# Patient Record
Sex: FEMALE | Race: WHITE
[De-identification: names, ages, dates, MRNs, and addresses within clinical notes are randomized per-mention and may not be internally consistent; named-entity substitution may affect disease eponyms.]

---

## 2018-09-11 ENCOUNTER — HOSPITAL ENCOUNTER (INPATIENT)
Dept: HOSPITAL 12 - SRC | Age: 58
LOS: 7 days | Discharge: OTHER INSTITUTION | DRG: 895 | End: 2018-09-18
Attending: INTERNAL MEDICINE | Admitting: INTERNAL MEDICINE
Payer: COMMERCIAL

## 2018-09-11 VITALS — BODY MASS INDEX: 27.32 KG/M2 | WEIGHT: 170 LBS | HEIGHT: 66 IN

## 2018-09-11 VITALS — DIASTOLIC BLOOD PRESSURE: 58 MMHG | SYSTOLIC BLOOD PRESSURE: 103 MMHG

## 2018-09-11 VITALS — DIASTOLIC BLOOD PRESSURE: 61 MMHG | SYSTOLIC BLOOD PRESSURE: 103 MMHG

## 2018-09-11 DIAGNOSIS — S00.81XA: ICD-10-CM
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
## 2018-09-11 DIAGNOSIS — Z90.710: ICD-10-CM

## 2018-09-11 DIAGNOSIS — F41.9: ICD-10-CM

## 2018-09-11 DIAGNOSIS — Z79.899: ICD-10-CM
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
## 2018-09-11 DIAGNOSIS — K21.9: ICD-10-CM
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
## 2018-09-11 DIAGNOSIS — Z86.69: ICD-10-CM

## 2018-09-11 DIAGNOSIS — F10.230: Primary | ICD-10-CM

## 2018-09-11 DIAGNOSIS — F33.1: ICD-10-CM

## 2018-09-11 DIAGNOSIS — E83.42: ICD-10-CM

## 2018-09-11 LAB
ALP SERPL-CCNC: 84 U/L (ref 50–136)
ALT SERPL W/O P-5'-P-CCNC: 68 U/L (ref 14–59)
AMYLASE SERPL-CCNC: 66 U/L (ref 25–115)
AST SERPL-CCNC: 122 U/L (ref 15–37)
BASOPHILS # BLD AUTO: 0.1 K/UL (ref 0–8)
BASOPHILS NFR BLD AUTO: 1.3 % (ref 0–2)
BILIRUB SERPL-MCNC: 0.8 MG/DL (ref 0.2–1)
BUN SERPL-MCNC: 5 MG/DL (ref 7–18)
CHLORIDE SERPL-SCNC: 104 MMOL/L (ref 98–107)
CO2 SERPL-SCNC: 24 MMOL/L (ref 21–32)
CREAT SERPL-MCNC: 0.5 MG/DL (ref 0.6–1.3)
EOSINOPHIL # BLD AUTO: 0.2 K/UL (ref 0–0.7)
EOSINOPHIL NFR BLD AUTO: 2.9 % (ref 0–7)
ETHANOL SERPL-MCNC: 284 MG/DL (ref 0–0)
GLUCOSE SERPL-MCNC: 96 MG/DL (ref 74–106)
HCG UR QL: NEGATIVE
HCT VFR BLD AUTO: 39.1 % (ref 31.2–41.9)
HGB BLD-MCNC: 13.4 G/DL (ref 10.9–14.3)
LIPASE SERPL-CCNC: 221 U/L (ref 73–393)
LYMPHOCYTES # BLD AUTO: 1.7 K/UL (ref 20–40)
LYMPHOCYTES NFR BLD AUTO: 29.2 % (ref 20.5–51.5)
MAGNESIUM SERPL-MCNC: 1.6 MG/DL (ref 1.8–2.4)
MCH RBC QN AUTO: 36.4 UUG (ref 24.7–32.8)
MCHC RBC AUTO-ENTMCNC: 34 G/DL (ref 32.3–35.6)
MCV RBC AUTO: 106 FL (ref 75.5–95.3)
MONOCYTES # BLD AUTO: 0.8 K/UL (ref 2–10)
MONOCYTES NFR BLD AUTO: 14 % (ref 0–11)
NEUTROPHILS # BLD AUTO: 3.1 K/UL (ref 1.8–8.9)
NEUTROPHILS NFR BLD AUTO: 52.6 % (ref 38.5–71.5)
PLATELET # BLD AUTO: 120 K/UL (ref 179–408)
POTASSIUM SERPL-SCNC: 4.4 MMOL/L (ref 3.5–5.1)
RBC # BLD AUTO: 3.69 MIL/UL (ref 3.63–4.92)
TSH SERPL DL<=0.005 MIU/L-ACNC: 1.41 MIU/ML (ref 0.36–3.74)
WBC # BLD AUTO: 6 K/UL (ref 3.8–11.8)
WS STN SPEC: 7.7 G/DL (ref 6.4–8.2)

## 2018-09-11 PROCEDURE — A4663 DIALYSIS BLOOD PRESSURE CUFF: HCPCS

## 2018-09-11 PROCEDURE — G0480 DRUG TEST DEF 1-7 CLASSES: HCPCS

## 2018-09-11 PROCEDURE — HZ2ZZZZ DETOXIFICATION SERVICES FOR SUBSTANCE ABUSE TREATMENT: ICD-10-PCS | Performed by: INTERNAL MEDICINE

## 2018-09-11 RX ADMIN — FOLIC ACID SCH MG: 1 TABLET ORAL at 14:04

## 2018-09-11 RX ADMIN — THERA TABS SCH UDTAB: TAB at 14:04

## 2018-09-11 NOTE — NUR
End Of Shift 



Pt has been laying in bed mostly throughout shift since being admitted. Pt states she feels 
anxious about what to anticipate and wants to know how long she needs to be here. CIWA or 
tapers have not been started or ordered. Pt ate 100% of meals. No prns given. Safety 
measures in place. Endorsement given to night shift nurse.

## 2018-09-11 NOTE — NUR
Start of Shift



Patient on bed, noted to be depressed, melancholic and isolative. Patient appears not 
motivated to communicate, answering mostly with yes and no and with no eye contact. Patient 
verbalized experiencing intermittent chills, feeling anxious, fatigue, easily agitated and 
with tremors noted. Patient noted to be flushed, disheveled and unkempt, with body odor. 
Encouraged patient to take a shower. Patient stated that she will take a shower tomorrow. 
Fall, universal, seizure and safety prec in place. Call light within reach. Latest CIWA=17. 
Will continue to monitor.

## 2018-09-11 NOTE — NUR
PRN Ativan



Patient is tremulous, jittery and with tremors. Patient also c/o feeling anxious and with 
dark undereye circles. CIWA=17. Administered Ativan 2 mg PO PRN as ordered. Will reassess.

## 2018-09-11 NOTE — NUR
Pre-admission Note



Received pt at intake. Pt is a 57 y/o F [facility] Pt exhibits glossy red eyes, black and blue bruising that is clearing up around 
the eyes and nose, nose is swollen and has a scab/lesion right under L nostril - pt states 
this is all due to a fall from having a SZ unrelated to w/d last week tues. Pt has a flat 
affect, depressive mood, higher emotional amplitude, tearful at times when speaking, 
exhibiting feelings of remorse, presents agitation, anxiety, restlessness, and pt states she 
feels "tense, bothered and overwhelmed." Pt is the primary source of info, has a slow verbal 
response and low tone of voice. Pt is a poor historian, has difficulty thinking and 
concentrating. PMH of anxiety, depression, total hysterectomy, bladder repair surgery d/t 
complication from hysterectomy, GERD. Pt reports having sporadic seizures unrelated to ETOH 
withdrawal since in her 20's. Pt reports she had a seizure last week while cleaning in her 
home; states she cannot recall her memory before the seizure, son found her on the floor 
covered in blood from her nose. Pt states she has not been seen by the doctor in fear of 
judgement of her drinking. Hx of SI with SA in 2015; pt states she was always depressed and 
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 Pt states, "I wanted attention and 
took some small red pills, I don't remember what but I took a bunch of them and cut my 
wrists. It was only superficially." PCP is Dr. Foley in [place]. Pt reports having been to 
multiple treatment centers, never have completed treatment, always leaving early within 5 
days of admission. Denies smoking. 



Initial VS are BP: 131/85, HR: 96, RR: 16, O2sat: 96%



Substance Use Hx: 



1. ETOH - 96 oz (4 x 24 oz can) of beer daily x 6 month. Last used today 09/11/18, at 1030. 
Pt states, "I never stay sober but I have periods were I try to not drink and it would last 
typically last 1 to 2 days." FIrst used at 36 y/o after first divorce. Got involved with a 
bad bf that drank and progressed. First started becoming a problem in 2000. Longest sobriety 
is 60 days in 2013.

Pt reports she used to drink 8-12 x 12 oz beer daily for 15- 20 cans of 12 oz beer years 
prior and 16+ prior for 10 years.
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
## 2018-09-11 NOTE — NUR
Ativan PRN reassess



Patient appears less anxious, with tremors less prominent. Patient also is less tremulous, 
verbalized, "I feel a little better with the medication." CIWA=11.

## 2018-09-12 VITALS — SYSTOLIC BLOOD PRESSURE: 122 MMHG | DIASTOLIC BLOOD PRESSURE: 74 MMHG

## 2018-09-12 VITALS — DIASTOLIC BLOOD PRESSURE: 95 MMHG | SYSTOLIC BLOOD PRESSURE: 148 MMHG

## 2018-09-12 VITALS — DIASTOLIC BLOOD PRESSURE: 65 MMHG | SYSTOLIC BLOOD PRESSURE: 112 MMHG

## 2018-09-12 VITALS — SYSTOLIC BLOOD PRESSURE: 136 MMHG | DIASTOLIC BLOOD PRESSURE: 81 MMHG

## 2018-09-12 VITALS — SYSTOLIC BLOOD PRESSURE: 126 MMHG | DIASTOLIC BLOOD PRESSURE: 68 MMHG

## 2018-09-12 VITALS — SYSTOLIC BLOOD PRESSURE: 108 MMHG | DIASTOLIC BLOOD PRESSURE: 67 MMHG

## 2018-09-12 LAB
ALP SERPL-CCNC: 80 U/L (ref 50–136)
ALT SERPL W/O P-5'-P-CCNC: 59 U/L (ref 14–59)
AMPHETAMINES UR QL SCN>1000 NG/ML: NEGATIVE
AST SERPL-CCNC: 87 U/L (ref 15–37)
BARBITURATES UR QL SCN: NEGATIVE
BILIRUB DIRECT SERPL-MCNC: 0.4 MG/DL (ref 0–0.2)
BILIRUB SERPL-MCNC: 1.2 MG/DL (ref 0.2–1)
COCAINE UR QL SCN: NEGATIVE
MAGNESIUM SERPL-MCNC: 1.6 MG/DL (ref 1.8–2.4)
OPIATES UR QL SCN: NEGATIVE
PCP UR QL SCN>25 NG/ML: NEGATIVE
THC UR QL SCN>50 NG/ML: NEGATIVE
WS STN SPEC: 7 G/DL (ref 6.4–8.2)

## 2018-09-12 RX ADMIN — LORAZEPAM SCH MG: 1 TABLET ORAL at 12:50

## 2018-09-12 RX ADMIN — LORAZEPAM SCH MG: 1 TABLET ORAL at 21:21

## 2018-09-12 RX ADMIN — PANTOPRAZOLE SODIUM SCH MG: 40 TABLET, DELAYED RELEASE ORAL at 08:58

## 2018-09-12 RX ADMIN — THERA TABS SCH UDTAB: TAB at 08:58

## 2018-09-12 RX ADMIN — Medication SCH MG: at 10:37

## 2018-09-12 RX ADMIN — LORAZEPAM SCH MG: 1 TABLET ORAL at 08:57

## 2018-09-12 RX ADMIN — Medication SCH MG: at 08:58

## 2018-09-12 RX ADMIN — LORAZEPAM SCH MG: 1 TABLET ORAL at 16:34

## 2018-09-12 RX ADMIN — FOLIC ACID SCH MG: 1 TABLET ORAL at 08:58

## 2018-09-12 NOTE — NUR
CIWA  ASSESSMENT

CIWA=14 at 2000.  The patient experienced moderate withdrawal symptoms such as anxiety, 
agitation, irritability, headache, nervousness, tremors, restlessness, and fatigue.  
Scheduled medications will be administrated as ordered.  Encouraged to intake fluids as 
tolerated.  Safe and calm environment provided.  All needs met.  Safety measures: Call light 
within reach, bed locked in lowest position,  padded bed rails up x2.  Will continue to 
monitor closely.

## 2018-09-12 NOTE — NUR
End Of Shift



Pt has been isolative in room, speaks in a very low tone and soft voice, is hypoactive. Pt 
has  increasing amplitude of withdrawal symptom, gross tremors, anxiety, nausea, lethargy, 
fatigue,  agitation, restlessness, sweating, feeling cold/hot sensations, have sensitivity 
to the light and decreased appetite. Last CIWA 18. No prns given. Mag-ox given and slow-mag 
will start tomorrow. Pt refused PPD test; states she gets a positive result w/ redness at 
site but CXR is neg. BP elevated; last bp: 148/95 HR: 74. Encouraged pt to verbalize 
feelings about situation and to increase fluids as tolerated. Safety measures in place.

## 2018-09-12 NOTE — NUR
CIWA 11



Patient presents with intermittent chills, tremors, anxiety and with intermittent nausea. 
Patient stated that she has mild sensitivity to light. Will continue to monitor.

## 2018-09-12 NOTE — NUR
End of Shift



Patient continues to be depressed, melancholic and isolative. Patient verbalized that she 
still experiences intermittent chills, feeling anxious, fatigue, easily agitated and with 
tremors noted. Patient noted to be flushed, disheveled and unkempt, with body odor. Patient 
stated that she will take a shower this morning. Fall, universal, seizure and safety prec in 
place. Call light within reach. Latest CIWA=12, slept for 7 hours. Endorsed to AM shift 
nurse for continuity of care.

## 2018-09-12 NOTE — NUR
START OF SHIFT NOTE:

This report is on patient, 58 years old female, admitted yesterday for Alcohol withdrawal.  
The patient tolerated well with ordered 5 day Ativan taper, that started today.  Withdrawal 
symptoms will be closely monitoring.  Patient remains compliant with treatment, medications, 
and diet regimen.  Patient is alert and oriented x4, cooperative, with stable gait, clear 
soft speech.  Patient reports NKA, is on Full Code, Regular diet, Fall and Seizures 
precautions.  Patient report history of seizures r/t withdrawal from alcohol, the most 
recent  seizure was on 09/04/2018.  Latest CIWA= 18 at 1600.  Patient presented with 
moderate withdrawal symptoms such as anxiety, agitation, irritability, headache, mild 
nausea/w no vomiting,  nervousness, moderate tremors, restlessness, and fatigue.  No PRN 
medications was administered during day shift, per day shift nurse report.  Patient was 
encouraged to fluids intake as tolerated, and to attend groups activities.  Safe and calm 
environment provided.  All needs met.  Safety measures in place: Call light within reach, 
bed locked in lowest position,  padded bed rails up bilaterally.  Endorsed by  day shift 
nurse.  Report received.  Will be monitoring closely.

## 2018-09-12 NOTE — NUR
CIWA 12



Patient with continuous intermittent chills, anxiety, tremors and verbalized being sensitive 
to light. With dizziness reported. Will continue monitor.

## 2018-09-12 NOTE — NUR
Start Of Shift / CIWA 12



Pt is a 59 y/o F admitted yesterday 09/12/18 for medically supervised ETOH withdrawal. Pt is 
placed on a 5 day Ativan taper that will starting this morning. Received pt laying in bed w. 
a disheveled appearance, a depressive mood and a flat affect. Pt presents anxiety, gross 
tremors, nausea, alternating extremes of feeling cold and hot, sweating, fatigue, lethargy, 
agitation, easily irritable and restlessness. Encouraged pt to increase fluids as tolerated 
to facilitate in detox. Educated pt on s/s to report and pt verbalized understanding. Side 
rails upx2 and padded, bed is in low position, call light within reach. Safety measures in 
place. Will continue to monitor.

## 2018-09-12 NOTE — NUR
CIWA 16



Pt is laying in bed sleeping intermittently and states that she is feeling very tired. Pt 
presents gross tremors, nausea, anxiety and agitation, alternating extremes of feeling cold 
and hot, sweating, fatigue, lethargy, easily irritable and restlessness. Ativan 2 mg will be 
administered. Safety measures in place.

## 2018-09-12 NOTE — NUR
CIWA 18



Pt has been isolative in room, speaks in a very low tone, soft voice, is hypoactive. Pt 
presents nausea, lethargy, fatigue, anxious, is grossly tremulous, agitated, restless, have 
sensitivity to the light. Ativan 2 mg po will be administered. Encouraged pt to verbalize 
feelings about situation and to increase fluids as tolerated. Safety measures in place.

## 2018-09-13 VITALS — SYSTOLIC BLOOD PRESSURE: 131 MMHG | DIASTOLIC BLOOD PRESSURE: 78 MMHG

## 2018-09-13 VITALS — SYSTOLIC BLOOD PRESSURE: 145 MMHG | DIASTOLIC BLOOD PRESSURE: 88 MMHG

## 2018-09-13 VITALS — SYSTOLIC BLOOD PRESSURE: 119 MMHG | DIASTOLIC BLOOD PRESSURE: 68 MMHG

## 2018-09-13 VITALS — SYSTOLIC BLOOD PRESSURE: 123 MMHG | DIASTOLIC BLOOD PRESSURE: 75 MMHG

## 2018-09-13 VITALS — SYSTOLIC BLOOD PRESSURE: 135 MMHG | DIASTOLIC BLOOD PRESSURE: 86 MMHG

## 2018-09-13 VITALS — SYSTOLIC BLOOD PRESSURE: 148 MMHG | DIASTOLIC BLOOD PRESSURE: 91 MMHG

## 2018-09-13 VITALS — DIASTOLIC BLOOD PRESSURE: 49 MMHG | SYSTOLIC BLOOD PRESSURE: 148 MMHG

## 2018-09-13 VITALS — SYSTOLIC BLOOD PRESSURE: 134 MMHG | DIASTOLIC BLOOD PRESSURE: 77 MMHG

## 2018-09-13 PROCEDURE — HZ41ZZZ GROUP COUNSELING FOR SUBSTANCE ABUSE TREATMENT, BEHAVIORAL: ICD-10-PCS

## 2018-09-13 PROCEDURE — HZ31ZZZ INDIVIDUAL COUNSELING FOR SUBSTANCE ABUSE TREATMENT, BEHAVIORAL: ICD-10-PCS

## 2018-09-13 RX ADMIN — CLONIDINE HYDROCHLORIDE PRN MG: 0.1 TABLET ORAL at 21:35

## 2018-09-13 RX ADMIN — LORAZEPAM SCH MG: 1 TABLET ORAL at 08:47

## 2018-09-13 RX ADMIN — Medication SCH MG: at 08:47

## 2018-09-13 RX ADMIN — PANTOPRAZOLE SODIUM SCH MG: 40 TABLET, DELAYED RELEASE ORAL at 06:20

## 2018-09-13 RX ADMIN — LORAZEPAM SCH MG: 1 TABLET ORAL at 20:54

## 2018-09-13 RX ADMIN — IBUPROFEN PRN MG: 600 TABLET, FILM COATED ORAL at 08:47

## 2018-09-13 RX ADMIN — LORAZEPAM SCH MG: 1 TABLET ORAL at 14:25

## 2018-09-13 RX ADMIN — FOLIC ACID SCH MG: 1 TABLET ORAL at 08:47

## 2018-09-13 RX ADMIN — HYDROXYZINE PAMOATE PRN MG: 25 CAPSULE ORAL at 22:54

## 2018-09-13 RX ADMIN — THERA TABS SCH UDTAB: TAB at 08:47

## 2018-09-13 NOTE — NUR
CIWA 13

Client is in room, sitting in a couch, she appears with flushed face, moist skin, tremors, 
anxious, depressed mood, flat affect and difficulty concentrating. Client reports having no 
desire to leave her room, feeling imitable an anxious, fatigued, nausea, stomach cramps, 
chills, sweats, and poor appetite. Client refuses PRN medications to manage withdrawal 
symptoms. Will continue to monitor. Call light within reach.

## 2018-09-13 NOTE — NUR
PRN RE-ASSESSMENT

PRN Vistaril 25 mg PO administered for anxiety at 2254 was effective.  Patient is sleeping.  
Respirations are even and unlabored.  RR: 14.  Safe and calm environment provided.  All 
needs met.  Safety measures in place: Call light within reach, bed locked in lowest 
position, padded bed rails up bilaterally.  Will continue monitoring closely.

## 2018-09-13 NOTE — NUR
CIWA 14 & Motrin 600mh PO for headache

Client is in bed, she appears disheveled, strong body odor, and dark circles under eyes. She 
presents with depressed, anxious mood, flat affect tremors, clammy skin, avoidant gaze. 
Client reports headache 6/10 pain level, nausea, stomach cramps, irritability, anxiety, and 
fatigue. Client states, 'I don't feel like getting up, I don't feel well at all." Schedule 
Ativan 2mg PO + above PRN medication administered. Encourage client to increase PO fluid to 
facilitate detox. Encourage client to attend group therapy to learn skills to maintain 
sober. Call light within reach.

## 2018-09-13 NOTE — NUR
END OF SHIFT

Endorse client to incoming nurse, client is in group therapy, a/o x 4. Client continues to 
present with anxiety, agitation, gross tremors, flushed face sweats, difficulty 
concentrating, nausea, abdominal cramps, restless legs, feverish, decreased appetite, 
headache, sense of panic, and fatigue. PRN Motrin 600mg PO for HA. Last CIWA 13 @ 1600. 
Second of 5 day Ativan taper. Adequate PO fluid intake 2800mL, void x 4, stool x 1. Consumes 
75% of meals. Call light within reach.

## 2018-09-13 NOTE — NUR
PRN BENADRYL 50 MG  1TABLET PO ADMINISTRATION

PRN Benadryl  50 mg 1 tablet was administered for insomnia at 2056.  Patient tolerated well. 
 Safe and calm environment provided.   All needs met.  Safety measures in place:  Call light 
within reach, bed locked in lowest position, padded bed rails up bilaterally.  Will continue 
monitoring closely.

## 2018-09-13 NOTE — NUR
CIWA  ASSESSMENT

CIWA=12 at 0000.  The patient presented with anxiety, agitation, irritability, nervousness, 
sweating, flashed skin, clammy skin, tremors, restlessness, and fatigue.  Encouraged to 
intake fluids as tolerated.  Safe and calm environment provided.  All needs met.   Safety 
measures: Call light within reach, bed locked in lowest position, padded bed rails up x2.  
Will continue to monitor closely.

## 2018-09-13 NOTE — NUR
END  OF SHIFT NOTE:

Endorsed patient is alert and oriented x4, cooperative, with stable gait, clear soft speech, 
tolerated well with ordered 5 day Ativan taper, today is second day.  Patient was noted with 
anxiety, agitation, nervousness,  bilateral tremors, sweating, restlessness, and fatigue.  
CIWA=14 at 2000, CIWA=12 at 0000. Last CIWA= 12 at 0400.  Withdrawal symptoms was closely 
monitored.  No PRN medications was administered during my shift.  The patient remains 
compliant with treatment plan, medications, and diet regime.   Educated for non 
pharmacological method that can be used to help control pain.  Encouraged to intake fluids 
as tolerated.   Encouraged to attend group activities.  Patient slept for 11 hours, intake 
1,050 ml, output: voided x1, stool x1.  Safe and calm environment provided.  All needs met.  
Safety measures: Call light within reach, bed locked in lowest position, padded bed rails up 
x2.  Endorsed to day shift nurse.

## 2018-09-13 NOTE — NUR
START OF SHIFT NOTE

Report received for patient  admitted for Alcohol withdrawal, continues ordered 5 day Ativan 
taper, which tolerated well.    Withdrawal symptoms will be closely monitoring.  Patient is 
presented in her room  alert and oriented x4: place, time person, and situation.  The most 
recent CIWA=13 at 1634.  Throughout day shift she was c/o  anxiety, agitation, nervousness, 
mild nausea, w/o vomiting,  stomach cramps, bilateral tremors, flashed face, clammy skin, 
barely sweating, restlessness, and fatigue.   PRN Motrin 600 mg PO was administered  at 0847 
for headache, and was effective, per day shift nurse report.  Patient remains compliant with 
treatment, medications, and diet regime.  Patient denies to attend groups activities, and 
was encouraged to increased her activities.  Encouraged to fluids intake as tolerated.  Safe 
and calm environment provided.  All needs met.  Safety measures in place: Call light within 
reach,  bed is locked in lowest position, padded bed rails up x2.  Patient was endorsed by  
outgoing day shift nurse.  Will continue to monitor closely.

## 2018-09-13 NOTE — NUR
PRN RE-ASSESSMENT

PRN Clonidine 0.1 mg PO administered for /91 was effective: BP decreased to 145/88.  
1:1 sitter at bedside with patient for safety.   Safe and calm environment provided.  All 
needs met.  Safety measures in place: Call light within reach,  bed is locked in lowest 
position, padded bed rails up x2.   Will continue to monitor closely.

## 2018-09-13 NOTE — NUR
CIWA 13 

Client presents with anxiety, agitation, chills. Client also reports tremors, sweats, 
nausea, stomach cramps, decreased appetite, and fatigue. Encourage client to verbalize 
feelings that makes her feel anxious. Call light within reach.

## 2018-09-13 NOTE — NUR
PRN  RE-ASSESSMENT

Benadryl  50 mg PO administered for insomnia at 2056 was  ineffective.   PRN Medications 
will be administered as ordered.  1:1 sitter at bedside with patient for safety.  Safe and 
calm environment provided.  All needs met.  Safety measures in place: Call light within 
reach, bed is locked in lowest position, padded bed rails up x2.   Will continue to monitor 
closely.

## 2018-09-13 NOTE — NUR
1:1 status



Patient with episode of dizziness, nausea and verbalized "I'm feeling wobbly, feels like I'm 
floating." Patient stated that she lost he balance and hit her head on the side of the bed. 
Dr. Morris notified and her ordered to place patient on 1:1. BP [id number]=844/91. Primary Nurse 
is made aware. PRN medications will be administered.

## 2018-09-13 NOTE — NUR
PRN CLONIDINE 0.1 MG PO ADMINISTRATION 

PRN Clonidine 0.1 mg PO administered for /91.  Patient tolerated well.  1:1 sitter at 
bedside with patient for safety.  Safe and calm environment provided.  All needs met.  
Safety measures in place: Call light within reach,  bed is locked in lowest position, padded 
bed rails up x2.   Will continue to monitor closely.

## 2018-09-13 NOTE — NUR
START OF SHIFT

Endorse rcvd from ongoing nurse, client had an uneventful night, client slept 11 hrs. Last 
CIWA 12 @ 0400. Client is in bed, lying on her back, eyes closed, she sounds asleep, easy to 
arouse. RR 16, even, non-labored. Call light within reach. Will continue to monitor.

## 2018-09-13 NOTE — NUR
PRN RE-ASSESSMENT

Patient reports,"My nausea gone, but I am feeling anxious".  PRN Vistaril PO will be 
administered as ordered. 1:1 sitter for safety at bedside, as ordered. All needs met.  
Safety measures in place: Call light within reach,  bed is locked in lowest position, padded 
bed rails up x2.  Will continue to monitor closely.

## 2018-09-13 NOTE — NUR
CIWA  ASSESSMENT

CIWA=10 at 0400.  The patient appears sad and worried.  Mood anxious and flat affect.  The 
patient presented with following withdrawal symptoms such as anxiety, agitation, depression, 
irritability, nervousness,  sweating,  bilateral  tremors, restlessness,  and fatigue.   
Encouraged to intake fluids as tolerated.   Safe and calm environment provided.  All needs 
met.  Safety measures: Call light within reach, bed locked in lowest position,  padded bed 
rails up x2.  Will continue to monitor closely.

## 2018-09-13 NOTE — NUR
PRN  ZOFRAN  (ONDANSETRON 4 MG TAB.RAPDIS) 4 MG 1 TAB.RAPDIS SL ADMINISTRATION 

PRN Zofran 4 mg SL administered for nausea w/o vomiting.  Patient tolerated well.  1:1 
sitter at bedside with patient for safety.   Safe and calm environment provided.   All needs 
met.  Safety measures in place: Call light within reach,  bed is locked in lowest position, 
padded bed rails up x2.  Will continue to monitor closely.

## 2018-09-13 NOTE — NUR
CIWA  ASSESSMENT

CIWA=14  at 2000.  The patient appears worried, anxious mood, flat affect.  She experiences 
moderate withdrawal symptoms such as anxiety, agitation, irritability, nervousness,  nausea, 
stomach cramps, nervousness, tremors, restlessness, and fatigue.   Scheduled medications 
will be administrated as ordered.   Encouraged to intake fluids as tolerated.   Safe and 
calm environment provided.   All needs met.   Safety measures:  Call light within reach, bed 
locked in lowest position,  padded bed rails up x2.  Will continue to monitor closely.

## 2018-09-14 VITALS — DIASTOLIC BLOOD PRESSURE: 78 MMHG | SYSTOLIC BLOOD PRESSURE: 127 MMHG

## 2018-09-14 VITALS — SYSTOLIC BLOOD PRESSURE: 104 MMHG | DIASTOLIC BLOOD PRESSURE: 77 MMHG

## 2018-09-14 VITALS — SYSTOLIC BLOOD PRESSURE: 134 MMHG | DIASTOLIC BLOOD PRESSURE: 79 MMHG

## 2018-09-14 VITALS — SYSTOLIC BLOOD PRESSURE: 130 MMHG | DIASTOLIC BLOOD PRESSURE: 68 MMHG

## 2018-09-14 VITALS — SYSTOLIC BLOOD PRESSURE: 96 MMHG | DIASTOLIC BLOOD PRESSURE: 55 MMHG

## 2018-09-14 VITALS — SYSTOLIC BLOOD PRESSURE: 125 MMHG | DIASTOLIC BLOOD PRESSURE: 87 MMHG

## 2018-09-14 RX ADMIN — Medication SCH MG: at 09:02

## 2018-09-14 RX ADMIN — IBUPROFEN PRN MG: 600 TABLET, FILM COATED ORAL at 09:02

## 2018-09-14 RX ADMIN — IBUPROFEN PRN MG: 600 TABLET, FILM COATED ORAL at 01:21

## 2018-09-14 RX ADMIN — LORAZEPAM SCH MG: 1 TABLET ORAL at 20:51

## 2018-09-14 RX ADMIN — LORAZEPAM SCH MG: 1 TABLET ORAL at 09:02

## 2018-09-14 RX ADMIN — THERA TABS SCH UDTAB: TAB at 09:02

## 2018-09-14 RX ADMIN — FOLIC ACID SCH MG: 1 TABLET ORAL at 09:02

## 2018-09-14 RX ADMIN — LORAZEPAM SCH MG: 1 TABLET ORAL at 12:11

## 2018-09-14 RX ADMIN — LORAZEPAM SCH MG: 1 TABLET ORAL at 16:18

## 2018-09-14 RX ADMIN — PANTOPRAZOLE SODIUM SCH MG: 40 TABLET, DELAYED RELEASE ORAL at 06:57

## 2018-09-14 NOTE — NUR
CIWA  ASSESSMENT

CIWA=12  at 0400  Patient noted anxious, agitated, with flashed face, clammy skin, sweating, 
mild bilateral tremors, and restlessness.  Safe and calm environment provided.   1:1 sitter 
at bedside for safety.  All needs met.  Safety measures: Call light within reach, bed locked 
in lowest position,  padded bed rails up x2.  Will continue to monitor closely.

## 2018-09-14 NOTE — NUR
START OF SHIFT NOTE

Patient is a 58 year old female, presented  for alcohol withdrawal, continues ordered 5 day 
Ativan taper. Today is third day.  She is tolerated well.  CIWA=14  at 2000, CIWA=12 at 
0118.  The most recent CIWA=12 at 0400: Patient experienced  anxiety, agitation, episode of 
dizziness, lightheadedness,  headache, nervousness, nausea, w/o vomiting,  stomach cramps, 
bilateral tremors, flashed face, clammy skin, barely sweating, restlessness, and fatigue.   
Doctor Arturo aware. Order for 1:1 sitter placed.  Benadryl  50 mg PO administered for 
insomnia at 2056 was  ineffective.  PRN Clonidine 0.1 mg PO administered for /91 at 
2135,  PRN Zofran 4 mg SL administered for nausea w/o vomiting at 2142, PRN Vistaril 25 mg 
PO administered for anxiety at 2254, PRN Motrin 600 mg PO administered for headache at 0121, 
and were effective.  Patient remains compliant with medications.   Patient slept for 7 
hours, intake 1,355ml, output: voided x3.  Encouraged to fluids intake as tolerated.  
Encouraged to attend groups activities. Safe and calm environment provided.  All needs met.  
Safety measures in place: Call light within reach, bed locked in lowest position,  padded 
bed rails up bilaterally.  Patient endorsed to day shift nurse.

## 2018-09-14 NOTE — NUR
START OF SHIFT NOTE:

Patient received from outgoing day shift nurse.  Patient tolerated well with 5 day Ativan 
taper (today is third day), ordered  for Benzodiazepines and Alcohol withdrawal.   Patient 
is 1:1 for safety, as ordered. The last  CIWA= 7at 1600. Per day shift  nurse report, 
patient noted during day shift with  s anxiety, agitation, irritability, nervousness, barely 
sweating, headache, mild nausea,  tremors, restlessness, fatigue.   Patient was encouraged 
to fluids intake as tolerated, and to attend groups activities.  PRN Motrin 600 mg PO  
administered for headache at 0902 was effective.  Patient was encouraged to fluids intake as 
tolerated, and to attend groups activities.  Safe and calm environment provided.  All needs 
met.  Safety measures in place: Call light within reach, bed locked in lowest position,  
padded bed rails up bilaterally.  Patient endorsed by outgoing day shift nurse.  Report 
received.  Will be monitoring closely.

## 2018-09-14 NOTE — NUR
CIWA ASSESSMENT



ciwa=11. Easily irritable. Complaints of intermittent perspiration. Bilateral hand tremors 
noted. Anxious and restless. Fidgety and not able to lay still.

## 2018-09-14 NOTE — NUR
END OF SHIFT



Pt 59 y/o female admitted for benzo and opiate withdrawal. Pt alert and oriented to name, 
place, and time. Perrla. Skin warm and moist to touch. Respirations even and unlabored. 
Appears disheveled. Hair uncombed. Clothes scattered throughout the room. Encouraged to 
maintain hygiene. Restless. Fidgety, not able to lay still on bed. Bilateral hand tremors. 
Fatigued. 1:1 sitter for safety. Unsteady gait. Isolative to room. Did not attend group 
activity. Medication compliant. Last ciwa=11 Pt is on 5 day ativan taper and is on 3. Bed on 
lowest position with side rails x 2 up for safety. Call light within reach.

## 2018-09-14 NOTE — NUR
PRN MOTRIN 600 MG PO ADMINISTRATION

Patient c/o headache "5/10" and asked aid.  PRN Motrin 600 mg PO administered for headache 
at 0121 as ordered.   Patient tolerated well.   Safe and calm environment provided.  1:1 
sitter at bedside for safety.  All needs met.  Safety measures in place: Call light within 
reach, bed locked in lowest position, padded bed rails up bilaterally.  Will continue to 
monitor closely.

## 2018-09-14 NOTE — NUR
CIWA ASSESSMENT



ciwa=14. Anxious and restless. Not able to lay still, fidgety. Bilateral hand tremors noted. 
Perspiration on forehead noted. Pt states feels weak and generalized discomfort. Easily 
irritable/ agitated.

## 2018-09-14 NOTE — NUR
CIWA  ASSESSMENT

CIWA=12 at 0118.  The patient appears  worried, anxious mood, depressive affect.  She c/o  
headache"5/10', anxiety, agitation, irritability, nervousness, tremors, restlessness, and 
fatigue.  PRN Motrin PO will be administrated as ordered.   Encouraged to intake fluids as 
tolerated.   Safe and calm environment provided.   1:1 sitter at bedside for safety.  All 
needs met.  Safety measures: Call light within reach, bed locked in lowest position,  padded 
bed rails up x2.  Will continue to monitor closely.

## 2018-09-14 NOTE — NUR
CIWA 14 & PRN Motrin 600mg PO for HA 6/10. 

Client presents with flushed face, flat affect, avoidant gaze, tremors, clammy skin, and 
difficulty concentrating. Client reports tremors, restlessness, sweating, anxiety, 
depression, agitation, nervousness, fatigue, generalized pain, abd cramping and headache 
6/10. Schedule Ativan 1mg PO and above PRN administered. Encourage client to increase PO 
fluid intake as tolerated to facilitate detox. Sitter at bedside. Call light within reach.

## 2018-09-14 NOTE — NUR
Endorse client to RN for continuity of care, discuss all relevant information. RN to 
reassess PRN medications.

## 2018-09-14 NOTE — NUR
PRN BENADRYL 50 MG  1TABLET PO ADMINISTRATION

PRN Benadryl  50 mg 1 tablet was administered for insomnia at 2051.  Patient tolerated well. 
  1:1 sitter at bedside with patient for safety.  Safe and calm environment provided.  All 
needs met.  Safety measures in place: Call light within reach, bed is locked in lowest 
position, padded bed rails up x2.   Will continue to monitor closely.

## 2018-09-14 NOTE — NUR
START OF SHIFT 

Rcvd endorse from ongoing nurse, client is in room, she is lying on her back, eyes, closed, 
dark [place] under eyes and a scab on L upper lip noted, RR 16, even, non-labored, easy to 
arouse. Client is on a 1:1 sitter promoting safety. PRN Clonidine 0.1mg PO, Vistaril 25 mg 
PO for anxiety, Motrin 600mg PO for HA & Benadryl 50mg PO for insomnia, client has been 
sleeping for 7 hrs. Third of 5 day Ativan taper. Last CIWA 12 @ 0400. Seizure precautions in 
place. Bed in lowest/locked position, side rails x 2 up/padded. Call light within reach.

## 2018-09-14 NOTE — NUR
CIWA DEFERRED

Patient is sleeping with snoring.  RR:14.  Respirations are unlabored and even.  CIWA 
deferred, and will be done when patient will be awake.  1:1 sitter at bedside with patient 
for safety.  Safe and calm environment provided.  All needs met.  Safety measures in place: 
Call light within reach,  bed is locked in lowest position, padded bed rails up x2.   Will 
continue to monitor closely.

## 2018-09-14 NOTE — NUR
CIWA  ASSESSMENT

CIWA=10 at 2000.  Patient noted with anxiety, agitation,  nervousness, sweating,  bilateral 
tremors that can be felt, not observe, and restlessness.  Scheduled medications will be 
administered ,as ordered.  Safe and calm environment provided.  1:1 sitter at bedside for 
safety.  All needs met.  Safety measures in place: Call light within reach, bed locked in 
lowest position, padded bed rails up x2.  Will continue to monitor closely.

## 2018-09-14 NOTE — NUR
PRN RE-ASSESSMENT

PRN Motrin 600 mg PO administered for headache at 0121 was effective.  Patient is sleeping.  
RR: 15.  Respirations are even and unlabored.  Safe and calm environment provided.  1:1 
sitter at bedside for safety.  All needs met.  Safety measures in place: Call light within 
reach, bed locked in lowest position, padded bed rails up bilaterally.  Will continue to 
monitor closely.

## 2018-09-15 VITALS — SYSTOLIC BLOOD PRESSURE: 118 MMHG | DIASTOLIC BLOOD PRESSURE: 80 MMHG

## 2018-09-15 VITALS — DIASTOLIC BLOOD PRESSURE: 75 MMHG | SYSTOLIC BLOOD PRESSURE: 118 MMHG

## 2018-09-15 VITALS — SYSTOLIC BLOOD PRESSURE: 123 MMHG | DIASTOLIC BLOOD PRESSURE: 80 MMHG

## 2018-09-15 VITALS — SYSTOLIC BLOOD PRESSURE: 119 MMHG | DIASTOLIC BLOOD PRESSURE: 72 MMHG

## 2018-09-15 VITALS — SYSTOLIC BLOOD PRESSURE: 105 MMHG | DIASTOLIC BLOOD PRESSURE: 74 MMHG

## 2018-09-15 VITALS — DIASTOLIC BLOOD PRESSURE: 62 MMHG | SYSTOLIC BLOOD PRESSURE: 107 MMHG

## 2018-09-15 RX ADMIN — PANTOPRAZOLE SODIUM SCH MG: 40 TABLET, DELAYED RELEASE ORAL at 07:06

## 2018-09-15 RX ADMIN — LORAZEPAM SCH MG: 1 TABLET ORAL at 14:08

## 2018-09-15 RX ADMIN — Medication SCH MG: at 08:50

## 2018-09-15 RX ADMIN — Medication SCH MG: at 16:04

## 2018-09-15 RX ADMIN — FOLIC ACID SCH MG: 1 TABLET ORAL at 08:50

## 2018-09-15 RX ADMIN — THERA TABS SCH UDTAB: TAB at 08:50

## 2018-09-15 RX ADMIN — LORAZEPAM SCH MG: 1 TABLET ORAL at 08:50

## 2018-09-15 RX ADMIN — LORAZEPAM SCH MG: 1 TABLET ORAL at 20:29

## 2018-09-15 NOTE — NUR
END OF SHIFT



Pt 57 y/o female admitted for benzo and opiate withdrawal. 1:1 sitter for safety. Pt alert 
and oriented to name, place, and time. Perrla. Skin warm and moist to touch. Respirations 
even and unlabored. Appears disheveled. Hair uncombed. Clothes scattered throughout the 
room. Encouraged to maintain hygiene. Low motivation for self care. Restless. Fidgety. 
Bilateral hand tremors. Fatigued. Unsteady gait. Isolative to room. Did not attend group 
activity. Medication compliant. Last ciwa=10. Pt is on  a 5 day ativan taper and is on 4. 
Bed on lowest position with side rails x 2 up for safety. Call light within reach.

## 2018-09-15 NOTE — NUR
CIWA=9

Patient  presented with anxiety, agitation, nervousness, tremors, that can be felt, not 
observe,  sweating, flashed face, and clammy skin, fatigue, and restlessness.   Encouraged 
to intake fluids, as tolerated.   1:1 sitter at bedside for safety.  All needs met.  Safe 
and calm environment provide.  Safety measures in place.  Safety measures in place: Call 
light within reach, bed locked in lowest position,  padded bed rails up bilaterally.   Will 
continue to monitor closely.

## 2018-09-15 NOTE — NUR
CIWA ASSESSMENT



ciwa=10. Anxious and restless. Fidgety. Pressured speech noted. Not able to lay still. 
Bilateral hand tremors noted.

## 2018-09-15 NOTE — NUR
START OF SHIFT



Pt 58 y/ female admitted for etoh withdrawal. Pt received in room watching television. 1:1 
sitter for safety. Alert and oriented to name, place, and time. Perrla. Skin warm and moist 
to touch. Respirations even and unlabored. Appears disheveled. Hair uncombed. Clothes 
scattered throughout the room. Encouraged to maintain hygiene. Restless. Fidgety. Not able 
to lay still. Fatigued. Bilateral hand tremors noted. It was reported that pt slept for 9 
hours last night. Last cwia=9 @ 0000. Pt is on a 5 day ativan taper and is on day 5. Bed on 
lowest position with side rails x2 up for safety. Call light within reach.

## 2018-09-15 NOTE — NUR
Start Of Shift

Patient is a 58 yr old female who was admitted to [facility] on 9/11/18 for a medically 
supervised withdrawal from ETOH ( Beer), she has been placed on a 5 day Ativan taper and 
this is day 4. No PRN medication was requested or required on day shift. She continues on a 
1:1 for safety and unsteady gait, sitter is at bedside. Currently she is reading in bed and 
voices no concerns. her mood appears depressed and her affect is flat. Continue to follow MD 
plan of care and offer support and encouragement.

## 2018-09-15 NOTE — NUR
CIWA ASSESSMENT



ciwa=10. Bilateral hand tremors noted. Intermittent perspiration. Fatigued. Restless. 
Pressured speech.

## 2018-09-15 NOTE — NUR
END OF SHIFT NOTE:

Patient is a 58 year female admitted for  alcohol withdrawal, continues 5 day Ativan taper, 
which tolerated well. Today is day 4.  CIWA=10 at 2000, CIWA=9 at 0000.  The most recent  
CIWA=9.  Patient presented  with anxiety, agitation,  nervousness, sweating,  bilateral 
tremors that can be felt, not observe, and restlessness.   Patient was encouraged to fluids 
intake as tolerated. PRN Benadryl  50 mg 1 tablet was administered for insomnia at 2051, and 
was effective.   Patient remains compliant with treatment, medications, and diet regimen.   
Patient slept for 9 hours, intake 700 ml, output: voided x3.  All needs met.  Safe and calm 
environment provide.  1:1 sitter at bedside  for safety, as ordered.  Safety measures in 
place: Call light within reach, bed locked in lowest position, padded bed rails up 
bilaterally.   Patient endorsed to day shift  nurse.

## 2018-09-15 NOTE — NUR
CIWA ASSESSMENT



ciwa=10. Bilateral hand tremors noted. Fatigued. Anxious and restless. Fidgety. Pressured 
speech noted. Not able to lay still.

## 2018-09-15 NOTE — NUR
CIWA  ASSESSMENT

Patient appears anxious, agitated, with flashed face, clammy skin, sweating,  bilateral 
tremors, and restlessness.  CIWA=9 at 0000.   Safe and calm environment provided.   1:1 
sitter at bedside for safety.  All needs met.  Safety measures: Call light within reach, bed 
locked in lowest position, padded bed rails up x2.  Will continue to monitor closely.

## 2018-09-16 VITALS — DIASTOLIC BLOOD PRESSURE: 76 MMHG | SYSTOLIC BLOOD PRESSURE: 114 MMHG

## 2018-09-16 VITALS — SYSTOLIC BLOOD PRESSURE: 110 MMHG | DIASTOLIC BLOOD PRESSURE: 66 MMHG

## 2018-09-16 VITALS — DIASTOLIC BLOOD PRESSURE: 64 MMHG | SYSTOLIC BLOOD PRESSURE: 102 MMHG

## 2018-09-16 VITALS — DIASTOLIC BLOOD PRESSURE: 93 MMHG | SYSTOLIC BLOOD PRESSURE: 134 MMHG

## 2018-09-16 VITALS — DIASTOLIC BLOOD PRESSURE: 69 MMHG | SYSTOLIC BLOOD PRESSURE: 109 MMHG

## 2018-09-16 LAB
ALP SERPL-CCNC: 76 U/L (ref 50–136)
ALT SERPL W/O P-5'-P-CCNC: 51 U/L (ref 14–59)
AST SERPL-CCNC: 65 U/L (ref 15–37)
BASOPHILS # BLD AUTO: 0 K/UL (ref 0–8)
BASOPHILS NFR BLD AUTO: 1 % (ref 0–2)
BILIRUB DIRECT SERPL-MCNC: 0.3 MG/DL (ref 0–0.2)
BILIRUB SERPL-MCNC: 0.9 MG/DL (ref 0.2–1)
EOSINOPHIL # BLD AUTO: 0.2 K/UL (ref 0–0.7)
EOSINOPHIL NFR BLD AUTO: 5.5 % (ref 0–7)
EOSINOPHIL NFR BLD MANUAL: 5 % (ref 0–8)
HCT VFR BLD AUTO: 39.5 % (ref 31.2–41.9)
HGB BLD-MCNC: 13.4 G/DL (ref 10.9–14.3)
LYMPHOCYTES # BLD AUTO: 1.1 K/UL (ref 20–40)
LYMPHOCYTES NFR BLD AUTO: 26.7 % (ref 20.5–51.5)
LYMPHOCYTES NFR BLD MANUAL: 27 % (ref 20–40)
MAGNESIUM SERPL-MCNC: 1.4 MG/DL (ref 1.8–2.4)
MCH RBC QN AUTO: 36.2 UUG (ref 24.7–32.8)
MCHC RBC AUTO-ENTMCNC: 34 G/DL (ref 32.3–35.6)
MCV RBC AUTO: 106.8 FL (ref 75.5–95.3)
MONOCYTES # BLD AUTO: 0.7 K/UL (ref 2–10)
MONOCYTES NFR BLD AUTO: 17.4 % (ref 0–11)
MONOCYTES NFR BLD MANUAL: 15 % (ref 2–10)
NEUTROPHILS # BLD AUTO: 2 K/UL (ref 1.8–8.9)
NEUTROPHILS NFR BLD AUTO: 49.4 % (ref 38.5–71.5)
NEUTS SEG NFR BLD MANUAL: 53 % (ref 42–75)
PLATELET # BLD AUTO: 92 K/UL (ref 179–408)
RBC # BLD AUTO: 3.7 MIL/UL (ref 3.63–4.92)
WBC # BLD AUTO: 4.1 K/UL (ref 3.8–11.8)
WS STN SPEC: 6.8 G/DL (ref 6.4–8.2)

## 2018-09-16 RX ADMIN — PANTOPRAZOLE SODIUM SCH MG: 40 TABLET, DELAYED RELEASE ORAL at 06:45

## 2018-09-16 RX ADMIN — CLONIDINE HYDROCHLORIDE PRN MG: 0.1 TABLET ORAL at 00:56

## 2018-09-16 RX ADMIN — THERA TABS SCH UDTAB: TAB at 08:21

## 2018-09-16 RX ADMIN — Medication SCH MG: at 08:22

## 2018-09-16 RX ADMIN — LORAZEPAM SCH MG: 1 TABLET ORAL at 08:21

## 2018-09-16 RX ADMIN — Medication SCH MG: at 08:21

## 2018-09-16 RX ADMIN — FOLIC ACID SCH MG: 1 TABLET ORAL at 08:21

## 2018-09-16 RX ADMIN — HYDROXYZINE PAMOATE PRN MG: 25 CAPSULE ORAL at 00:56

## 2018-09-16 RX ADMIN — LORAZEPAM SCH MG: 1 TABLET ORAL at 20:27

## 2018-09-16 NOTE — NUR
End of Shift:   

Patient is a 58 yr old female who was admitted to [facility] on 9/11/18 for a medically 
supervised withdrawal from ETOH ( Beer), she has been placed on a 5 day Ativan taper and 
this is day 5.  PRN medication given on this shift: benadryl, Clonidine and Vistaril. She 
continues on a 1:1 for safety and unsteady gait, sitter is at bedside. Her mood appears 
depressed and her affect is flat, her withdrawal symptoms present as increased anxiety, 
irritability, bilateral fine hand tremors and decreased appetite. She had a fluid intake of 
1900ml, 5 voids and 0 BM, she slept for 6 hours and last CIWA was 12 @ 0100. Continue to 
follow MD plan of care and offer support and encouragement. Endorsed to day shift nurse

## 2018-09-16 NOTE — NUR
Start Of Shift

Patient is a 58 yr old female who was admitted to [facility] on 9/11/18 for a medically 
supervised withdrawal from ETOH ( Beer), she has been placed on a 5 day Ativan taper and 
this is day 5. No PRN medication was requested or required on day shift. She continues on a 
1:1 for safety and unsteady gait, sitter is at bedside. Currently she is lying in bed awake 
and voices no concerns. her mood appears depressed and her affect is flat. Her last CIWA was 
10 @ 1600. Continue to follow MD plan of care and offer support and encouragement.

## 2018-09-16 NOTE — NUR
CIWA ASSESSMENT

CIWA-12, patient continues to exhibits s/s of withdrawal such as sweats, anxiety, agitation, 
restless, fatigue, per patient light headed and auditory hallucinations subsided.

## 2018-09-16 NOTE — NUR
CIWA 12

Patient presents with increased anxiety, restlessness, inability to stay asleep, vivid 
dreams and difficulty thinking clearly.

Vistaril 25mg PO given for anxiety

Clonidine 0.1mg PO given for increased BP of 134/93 and for anxiety

## 2018-09-16 NOTE — NUR
CIWA ASSESSMENT

CIWA-10, patient continues to exhibits s/s of withdrawal such as sweats, anxiety, agitation, 
restless, fatigue, anhedonia, dysphoria. Encourage patient to use non pharmacological 
intervention, patient continues on 1:1 for safety. All safety measures in place.

## 2018-09-16 NOTE — NUR
END OF SHIFT NOTE

Gave report to night nurse, patient continues with Ativan taper tolerating well. Patient 
continues on 1:1 for safety. Patient presented with anxiety, restless, agitation, fatigue, 
ringing in the ears but no auditory hallucinations. MD notified. During shift patient did 
not receive any PRN'S. Patient was seen by MD with new order for mag-ox 800mg for low 
magnesium level patient tolerated well. Patient rested in her room most of the shift. 
Patient consumed 100% of her meals. All safety measures in place. patient endorse to night 
nurse in stable condition.

## 2018-09-16 NOTE — NUR
PRN

Benadryl 50mg PO given per request for sleep, will continue to monitor, 1:1 sitter at 
bedside for safety.

## 2018-09-16 NOTE — NUR
CIWA ASSESSMENT

CIWA-14, patient presented with anxiety, agitation, restless, fatigue, light headed, sweats, 
complains of auditory hallucinations " I am hearing someone is talking to me". Patient was 
given schedule medications.

## 2018-09-16 NOTE — NUR
PRN 

PRN Clonidine 0.1mg PO and Vistaril 25mg PO given for increased anxiety and inability to 
fall back asleep

## 2018-09-16 NOTE — NUR
START OF SHIFT NOTE

Received report from night nurse 58 year old female admitted for ETOH withdrawal and 
continues on 5 days Ativan taper tolerating well. Per endorsement patient received PRN 
Benadryl, Vistaril, Clonidine, slept for 6 hours and last CIWA-12, patient continues on 1:1 
for safety. Received patient alert awake oriented x4, presented with anxiety, agitation 
restless, fatigue, bilateral hand tremors noted. Patient due for schedule medications. 
Breathing normal no SOB noted. Respiration even non labored. Skin intact warm and dry to 
touch. All safety measures in place call light within reach. Will cont to monitor.

## 2018-09-16 NOTE — NUR
CIWA 11

Patient's withdrawal symptoms presents as anhedonia, lethargy, gross bilateral hand tremors 
and increased anxiety and irritability.

Schedule Ativan 1mg PO given and Benadryl 50mg PO for sleep aid.

## 2018-09-16 NOTE — NUR
PRN Reassess

Patient is asleep in bed, eyes closed, breathing even and unlabored, sitter at bedside

## 2018-09-16 NOTE — NUR
CIWA/VITALS

Vitals refused per patient request to sleep through the night

CIWA deferred due to patient asleep

## 2018-09-17 VITALS — DIASTOLIC BLOOD PRESSURE: 86 MMHG | SYSTOLIC BLOOD PRESSURE: 131 MMHG

## 2018-09-17 VITALS — DIASTOLIC BLOOD PRESSURE: 68 MMHG | SYSTOLIC BLOOD PRESSURE: 113 MMHG

## 2018-09-17 VITALS — SYSTOLIC BLOOD PRESSURE: 112 MMHG | DIASTOLIC BLOOD PRESSURE: 70 MMHG

## 2018-09-17 VITALS — SYSTOLIC BLOOD PRESSURE: 109 MMHG | DIASTOLIC BLOOD PRESSURE: 70 MMHG

## 2018-09-17 RX ADMIN — Medication SCH MG: at 08:22

## 2018-09-17 RX ADMIN — THERA TABS SCH UDTAB: TAB at 08:22

## 2018-09-17 RX ADMIN — FOLIC ACID SCH MG: 1 TABLET ORAL at 08:22

## 2018-09-17 RX ADMIN — PANTOPRAZOLE SODIUM SCH MG: 40 TABLET, DELAYED RELEASE ORAL at 06:38

## 2018-09-17 NOTE — NUR
CIWA

CIWA deferred due to sleep.

Vitals refused, patient does not want to be woken up through the night. Breathing even, RR 
14, sitter at bedside

## 2018-09-17 NOTE — NUR
CIWA ASSESSMENT

CIWA-8, patient reported feeling anxious, agitated, restless, fatigue, anhedonia, dysphoria. 
Encourage patient to use non pharmacological intervention, patient continues on 1:1 for 
safety. All safety measures in place.

## 2018-09-17 NOTE — NUR
End of Shift:   

Patient is a 58 yr old female who was admitted to [facility] on 9/11/18 for a medically 
supervised withdrawal from ETOH ( Beer), she has been placed on a 5 day Ativan taper and 
this is day 5.  PRN medication given on this shift: Benadryl. She continues on a 1:1 for 
safety and unsteady gait, sitter is at bedside. Her mood appears depressed and her affect is 
flat, her withdrawal symptoms present as increased anxiety, irritability, bilateral fine 
hand tremors and decreased appetite. She had a fluid intake of 1250ml, 5 voids and 0 BM, she 
slept for  7 hours and last CIWA was 11 @ 2000. Continue to follow MD plan of care and offer 
support and encouragement. Endorsed to day shift nurse.

## 2018-09-17 NOTE — NUR
Start of shift 

Patient is a 58 year old female admitted on 9/11/2018. Patient is here at [facility] 
for medically supervision of Alcohol withdrawal. Patient was on a 5 day Ativan taper. 
Patient last CIWA was 8. Patient is set for discharge tomorrow 9/18/2018. Per endorsement 
patient did not have any PRN medications. Patient is on fall and seizure precautions and has 
a 1:1 sitter. Upon rounds patient was noted in med watching tv. Patient verbalized 
understanding of plan of care. Patient asked for PRN Benadryl for sleep. Patient was noted 
withdrawn, tense, and depressed. Respirations are even and unlabored. Patient denies any 
pain. Safety measures in place, bed locked in low position, side rails up x2, and call light 
within reach. Will continue to monitor.

## 2018-09-17 NOTE — NUR
PRN Benadryl Reassessment 

Patient was noted in bed lying with eyes closed. Patient was breathing even and unlabored. 
Medication was noted to be effective. Safety measures in place, bed locked in low position, 
side rails up x2, and call light within reach. Will continue to monitor.

## 2018-09-17 NOTE — NUR
Therapist prompted client to attend all group therapy sessions and client stated that she is 
planning on attending today's afternoon group.

## 2018-09-17 NOTE — NUR
CIWA ASSESSMENT

CIWA-11, patient noted attending groups activities, Patient reported feeling less anxious, 
agitated, restless, fatigue.

## 2018-09-17 NOTE — NUR
END OF SHIFT NOTE

Gave report to night nurse, 58 year old female admitted for ETOH withdrawal. Patient 
completed her Ativan taper tolerated well. Patient presented with blunt facial expression, 
anxiety, agitation, restless, light headed, diaphoresis, bilateral hand tremors. Patient was 
given scheduled medications. Patient's magnesium level was low which was replaced it with 
800mg mag-ox as ordered, patient tolerated well. Patient noted attending groups activities 
and gait is steady still on 1:1 for for safety. Patient denies any SI/HI. Vital signs WNL. 
Patient scheduled for discharge in AM. Encourage PO fluids as tolerated. Last CIWA score 
was-8 . All safety measures in place, call light within reach. Patient endorse to night 
nurse in stable condition.

## 2018-09-17 NOTE — NUR
PRN Benadryl 50 mg 

Patient presented with difficulty sleeping and was requesting for PRN Benadryl 50 mg. 
Medication was administered and was tolerated well. Respirations were even and unlabored. 
Safety measures in place, bed locked in low position, side rails up x2, and call light 
within reach. Will continue to monitor.

## 2018-09-17 NOTE — NUR
START OF SHIFT NOTE

Received report from night nurse 58 year old female admitted for ETOH withdrawal and 
continues on 5 days Ativan taper tolerating well. Per endorsement patient received PRN 
Benadryl, slept for 7 hours and last CIWA-11, patient continues on 1:1 for safety. Patient 
is currently sleeping responsive to verbal and tactile stimuli. Breathing normal no SOB 
noted. Respiration even non labored. Skin intact warm and dry to touch. All safety measures 
in place call light within reach. Will cont to monitor.

## 2018-09-17 NOTE — NUR
CIWA ASSESSMENT

CIWA score -13, patient presented with anxiety, agitation, restless, fatigue,  sweats. 
Patient was given schedule medications.

## 2018-09-17 NOTE — NUR
CIWA Assessment 

Patient is presenting with s/s of withdrawal: anxiety, agitation, sweats and chills. 
Patients CIWA =8. Respirations are even and unlabored. Will continue to monitor.

## 2018-09-18 VITALS — SYSTOLIC BLOOD PRESSURE: 106 MMHG | DIASTOLIC BLOOD PRESSURE: 75 MMHG

## 2018-09-18 VITALS — SYSTOLIC BLOOD PRESSURE: 100 MMHG | DIASTOLIC BLOOD PRESSURE: 72 MMHG

## 2018-09-18 VITALS — DIASTOLIC BLOOD PRESSURE: 79 MMHG | SYSTOLIC BLOOD PRESSURE: 102 MMHG

## 2018-09-18 RX ADMIN — FOLIC ACID SCH MG: 1 TABLET ORAL at 09:03

## 2018-09-18 RX ADMIN — Medication SCH MG: at 09:03

## 2018-09-18 RX ADMIN — THERA TABS SCH UDTAB: TAB at 09:03

## 2018-09-18 RX ADMIN — PANTOPRAZOLE SODIUM SCH MG: 40 TABLET, DELAYED RELEASE ORAL at 06:57

## 2018-09-18 NOTE — NUR
End of shift 

Patient is a 58 year old female admitted on 9/11/2018. Patient is here at [facility] 
for medically supervision of Alcohol withdrawal. Patient was on a 5 day Ativan taper. 
Patient last CIWA was 8. Patient is set for discharge today 9/18/2018. Patient had PRN 
Benadryl medication at 2138. Patient is on fall and seizure precautions and has a 1:1 
sitter. Patient slept for 8 hours. Patients total intake was 1259 ml and voided x3 and had 
no bowel movements. Patient was compliant with plan of care. Respirations are even and 
unlabored. Safety measures in place, bed locked in low position, side rails up x2, and call 
light within reach. Will endorse to day shift

## 2018-09-18 NOTE — NUR
CIWA 3

Pt denies any signs or symptoms or withdrawals. Pt is anxious. Will continue to monitor, 
support and encourage according to plan of care.

## 2018-09-18 NOTE — NUR
Nursing Note

Okay to remove 1:1 sitter. Pt ambulates with a steady gait. She denies any dizziness, 
lightheadedness, or pain.

## 2018-09-18 NOTE — NUR
CIWA Deferred 

Patient was noted in bed resting with eyes closed, breathing even and unlabored. Per 
protocol CIWA is to be assessed while awake. Safety measures in place, bed locked in low 
position, side rails up x2, and call light within reach. Will continue to monitor.

## 2018-09-18 NOTE — NUR
Start of Shift

Writer received report on 58 year old female admitted to [facility] on 9/11/18 for medical 
management of ETOH withdrawals. Pt endorses NKA, full code and regular diet. Endorses PMH of 
sporadic seizures unrelated to ETOH, per pt. PPH of anxiety and depression. Pt has completed 
an Ativan taper and is scheduled to discharge today with last CIWA 8, per NOC report. Pt was 
administered PRN Benadryl(Insomnia) on NOC, per report. Writer encounters pt in pts room 
with pt resting. Pt is A/O x4 and makes needs known. Pt with linear thought process and 
clear speech pattern. Pt is calm and cooperative with a flat affect and  congruent mood. Pt 
denies any signs or symptoms of withdrawals. Denies anxiety related to transition into 
treatment. Bed in low position with wheels locked and side rails up x2. Will continue to 
monitor, support and encourage according to plan of care.

## 2018-09-18 NOTE — NUR
Discharge Assessment

Pt is A/O x4 and makes her needs known. Linear thought process and clear speech patter. Flat 
affect with congruent mood. Pt denies any signs and symptoms of withdrawal, pt is noted to 
be mildly anxious. Pt has been to treatment previously and states she is not anxious about 
the transition. Pt is calm and cooperative. Denies SI/HI or A/VH. Writer educated pt on 
discharge medication including time, name, route, dose and indication of all prescribed 
medications. Prescriptions included in discharge packet. Writer educated pt on importance of 
continued sobriety and follow-up care. Educated on discharge paperwork, including discharge 
educational material and MD notes and lab results. Pt denies any further comments, questions 
or concerns. Pt discharged facility via private transportation to her RTC.

## 2019-08-18 NOTE — NUR
CIWA Deferred

Patient was noted in bed resting with eyes closed, breathing even and unlabored. Per 
protocol CIWA is to be assessed while awake. Safety measures in place. Will continue to 
monitor. 18-Aug-2019

## 2019-09-25 ENCOUNTER — OFFICE (OUTPATIENT)
Dept: URBAN - METROPOLITAN AREA CLINIC 106 | Facility: CLINIC | Age: 59
End: 2019-09-25

## 2019-09-25 VITALS
RESPIRATION RATE: 18 BRPM | HEART RATE: 90 BPM | WEIGHT: 186 LBS | DIASTOLIC BLOOD PRESSURE: 83 MMHG | SYSTOLIC BLOOD PRESSURE: 143 MMHG | HEIGHT: 66 IN

## 2019-09-25 DIAGNOSIS — Z12.11 SCREENING FOR MALIGNANT NEOPLASMS OF COLON: ICD-10-CM

## 2019-09-25 DIAGNOSIS — I10 HYPERTENSIVE DISORDER: ICD-10-CM

## 2019-09-25 DIAGNOSIS — R13.10 DYSPHAGIA: ICD-10-CM

## 2019-09-25 DIAGNOSIS — I85.00: ICD-10-CM

## 2019-09-25 DIAGNOSIS — F10.10 HX OF ALCOHOL ABUSE: ICD-10-CM

## 2019-09-25 DIAGNOSIS — K21.9 GASTROESOPHAGEAL REFLUX DISEASE: ICD-10-CM

## 2019-09-25 DIAGNOSIS — F32.9 DEPRESSIVE DISORDER: ICD-10-CM

## 2019-09-25 DIAGNOSIS — R05 CHRONIC COUGH: ICD-10-CM

## 2019-09-25 PROCEDURE — 99205 OFFICE O/P NEW HI 60 MIN: CPT | Performed by: INTERNAL MEDICINE

## 2019-09-25 NOTE — SERVICEHPINOTES
The patient is seen for the evaluation of suspected GERD.    Noted the onset of   chronic cough and heartburn dysphagia  several  weeks   ago  .   Symptoms have been occurring   several   time(s) per   weeks  .    During a given day, they are most prevalent   at random times of the day  .    They are exacerbated by   eating meals late at night  .   In the past, the patient has tried   .   Currently takes    dosed   .   On this therapy, symptom response has been   .    Associated symptoms include   dysphagia  .      Has also noted atypical (non-esophageal) symptoms including   .    A prior EGD has been performed and showed   hx of esophageal varices for hx of alcohol abuse  .   Patient has been off alcohol for the past six months.

## 2019-11-08 ENCOUNTER — OFFICE (OUTPATIENT)
Dept: URBAN - METROPOLITAN AREA CLINIC 106 | Facility: CLINIC | Age: 59
End: 2019-11-08

## 2019-11-08 VITALS
SYSTOLIC BLOOD PRESSURE: 140 MMHG | HEIGHT: 66 IN | HEART RATE: 89 BPM | WEIGHT: 197 LBS | DIASTOLIC BLOOD PRESSURE: 85 MMHG | RESPIRATION RATE: 18 BRPM

## 2019-11-08 DIAGNOSIS — Z12.11 SCREENING FOR MALIGNANT NEOPLASMS OF COLON: ICD-10-CM

## 2019-11-08 DIAGNOSIS — I85.00: ICD-10-CM

## 2019-11-08 DIAGNOSIS — K22.2 STRICTURE OF ESOPHAGUS: ICD-10-CM

## 2019-11-08 DIAGNOSIS — R13.10 DYSPHAGIA: ICD-10-CM

## 2019-11-08 DIAGNOSIS — Z83.71 FAMILY HISTORY OF COLON POLYPS: ICD-10-CM

## 2019-11-08 DIAGNOSIS — F32.9 DEPRESSIVE DISORDER: ICD-10-CM

## 2019-11-08 DIAGNOSIS — F10.10 HX OF ALCOHOL ABUSE: ICD-10-CM

## 2019-11-08 DIAGNOSIS — K21.9 GASTROESOPHAGEAL REFLUX DISEASE: ICD-10-CM

## 2019-11-08 DIAGNOSIS — I10 HYPERTENSIVE DISORDER: ICD-10-CM

## 2019-11-08 DIAGNOSIS — R05 CHRONIC COUGH: ICD-10-CM

## 2019-11-08 DIAGNOSIS — Z86.010 PERSONAL HISTORY COLON POLYPS: ICD-10-CM

## 2019-11-08 DIAGNOSIS — K64.8 HEMORRHOIDS, INTERNAL: ICD-10-CM

## 2019-11-08 PROCEDURE — 99215 OFFICE O/P EST HI 40 MIN: CPT | Performed by: INTERNAL MEDICINE

## 2019-11-08 NOTE — SERVICEHPINOTES
Patient came in for follow up after EGD and colonoscopy, had some gas and bloating after the test but is feeling well at this time. Denies any abdominal pain, nausea, vomiting, diarrhea or rectal bleeding.

## 2022-01-11 ENCOUNTER — OFFICE (OUTPATIENT)
Dept: URBAN - METROPOLITAN AREA CLINIC 106 | Facility: CLINIC | Age: 62
End: 2022-01-11

## 2022-01-11 VITALS — WEIGHT: 260 LBS | TEMPERATURE: 97.5 F | HEIGHT: 66 IN

## 2022-01-11 DIAGNOSIS — Z86.010 PERSONAL HISTORY COLON POLYPS: ICD-10-CM

## 2022-01-11 DIAGNOSIS — F32.9 DEPRESSIVE DISORDER: ICD-10-CM

## 2022-01-11 DIAGNOSIS — R05 CHRONIC COUGH: ICD-10-CM

## 2022-01-11 DIAGNOSIS — K21.9 GASTROESOPHAGEAL REFLUX DISEASE: ICD-10-CM

## 2022-01-11 DIAGNOSIS — I50.9 CHF: ICD-10-CM

## 2022-01-11 DIAGNOSIS — K64.8 HEMORRHOIDS, INTERNAL: ICD-10-CM

## 2022-01-11 DIAGNOSIS — R60.0 EDEMA OF LOWER EXTREMITY: ICD-10-CM

## 2022-01-11 DIAGNOSIS — I10 HYPERTENSIVE DISORDER: ICD-10-CM

## 2022-01-11 DIAGNOSIS — R13.10 DYSPHAGIA: ICD-10-CM

## 2022-01-11 DIAGNOSIS — F10.10 HX OF ALCOHOL ABUSE: ICD-10-CM

## 2022-01-11 DIAGNOSIS — K74.60 CIRRHOSIS OF LIVER: ICD-10-CM

## 2022-01-11 DIAGNOSIS — K80.20 CHOLELITHIASIS: ICD-10-CM

## 2022-01-11 DIAGNOSIS — Z83.71 FAMILY HISTORY OF COLON POLYPS: ICD-10-CM

## 2022-01-11 DIAGNOSIS — K22.2 STRICTURE OF ESOPHAGUS: ICD-10-CM

## 2022-01-11 DIAGNOSIS — I85.00: ICD-10-CM

## 2022-01-11 DIAGNOSIS — R18.8 ASCITES: ICD-10-CM

## 2022-01-11 DIAGNOSIS — D64.9 ANEMIA: ICD-10-CM

## 2022-01-11 DIAGNOSIS — R05 COUGH: ICD-10-CM

## 2022-01-11 DIAGNOSIS — K72.90 HEPATIC ENCEPHALOPATHY: ICD-10-CM

## 2022-01-11 PROCEDURE — 99215 OFFICE O/P EST HI 40 MIN: CPT | Performed by: INTERNAL MEDICINE

## 2022-01-11 NOTE — SERVICENOTES
[facility] notifying office Pataday 0.2% Ophth solutin 2.5 ml is not a covered medication under patient's plan, ordered on 1/9/17.  Alternatives include Evocetirizine and Cromolyn.  Please advise on change, strength, directions, quantity, and refills.   
Patient can use Cromolyn 4% opthalmic solution 1-2 drops in affected eye(s) four times per day.  Please dispense one bottle with no refills.  
Patient notified of change in medication Pataday 0.2% Ophth solutin to Cromolyn 4% opthalmic solution.  Patient verbalizes complete understanding.  Prescription sent to confirmed pharmacy.  
This case was reviewed by the supervising physician who agrees with the plan.

## 2022-01-11 NOTE — SERVICEHPINOTES
MATTHEW YOON   returns today for follow-up from last visit on   11/8/2019  .   Patient presents after discharge from [facility] for cirrhosis evaluation. Patient denies fever, nausea, vomiting, dysphagia, reflux, abdominal pain, change in bowel habits, constipation, diarrhea, rectal bleeding, melena, and significant change in weight. Denies shortness of breath and chest pain.

## 2022-02-02 ENCOUNTER — OFFICE (OUTPATIENT)
Dept: URBAN - METROPOLITAN AREA CLINIC 106 | Facility: CLINIC | Age: 62
End: 2022-02-02

## 2022-02-02 VITALS — HEIGHT: 66 IN | TEMPERATURE: 97.8 F | WEIGHT: 215.2 LBS

## 2022-02-02 DIAGNOSIS — R18.8 ASCITES: ICD-10-CM

## 2022-02-02 DIAGNOSIS — Z86.010 PERSONAL HISTORY COLON POLYPS: ICD-10-CM

## 2022-02-02 DIAGNOSIS — I10 HYPERTENSIVE DISORDER: ICD-10-CM

## 2022-02-02 DIAGNOSIS — Z83.71 FAMILY HISTORY OF COLON POLYPS: ICD-10-CM

## 2022-02-02 DIAGNOSIS — R13.10 DYSPHAGIA: ICD-10-CM

## 2022-02-02 DIAGNOSIS — I50.9 CHF: ICD-10-CM

## 2022-02-02 DIAGNOSIS — K80.20 CHOLELITHIASIS: ICD-10-CM

## 2022-02-02 DIAGNOSIS — K74.60 CIRRHOSIS OF LIVER: ICD-10-CM

## 2022-02-02 DIAGNOSIS — R60.0 EDEMA OF LOWER EXTREMITY: ICD-10-CM

## 2022-02-02 DIAGNOSIS — F32.9 DEPRESSIVE DISORDER: ICD-10-CM

## 2022-02-02 DIAGNOSIS — F10.10 HX OF ALCOHOL ABUSE: ICD-10-CM

## 2022-02-02 DIAGNOSIS — I85.00: ICD-10-CM

## 2022-02-02 DIAGNOSIS — R05 CHRONIC COUGH: ICD-10-CM

## 2022-02-02 DIAGNOSIS — D64.9 ANEMIA: ICD-10-CM

## 2022-02-02 DIAGNOSIS — K22.2 STRICTURE OF ESOPHAGUS: ICD-10-CM

## 2022-02-02 DIAGNOSIS — K72.90 HEPATIC ENCEPHALOPATHY: ICD-10-CM

## 2022-02-02 DIAGNOSIS — K64.8 HEMORRHOIDS, INTERNAL: ICD-10-CM

## 2022-02-02 DIAGNOSIS — K21.9 GASTROESOPHAGEAL REFLUX DISEASE: ICD-10-CM

## 2022-02-02 PROCEDURE — 99215 OFFICE O/P EST HI 40 MIN: CPT | Performed by: INTERNAL MEDICINE

## 2022-02-02 PROCEDURE — 99495 TRANSJ CARE MGMT MOD F2F 14D: CPT | Performed by: INTERNAL MEDICINE

## 2022-02-02 RX ORDER — FUROSEMIDE 40 MG/1
TABLET ORAL
Qty: 60 | Status: ACTIVE
Start: 2022-02-02

## 2022-02-02 RX ORDER — LACTULOSE 10 G/15ML
SOLUTION ORAL; RECTAL
Qty: 2700 | Status: ACTIVE
Start: 2022-02-02

## 2022-02-02 RX ORDER — SPIRONOLACTONE 100 MG/1
TABLET, FILM COATED ORAL
Qty: 30 | Status: ACTIVE
Start: 2022-02-02

## 2022-02-02 NOTE — SERVICEHPINOTES
This return visit is in reference to a recent hospitalization   for   variceal bleeding  .   The patient was discharged on   1/23/2022  .   Since then, the patient's symptoms/condition have   improved  .   In hospital treatment included   endoscopic therapy for hemostasis  .    Patient was seen at [facility] for variceal bleeding. At this time black stools have resolved. She does report bright red blood in her stool from hemorrhoids.